# Patient Record
Sex: MALE | Race: WHITE | URBAN - METROPOLITAN AREA
[De-identification: names, ages, dates, MRNs, and addresses within clinical notes are randomized per-mention and may not be internally consistent; named-entity substitution may affect disease eponyms.]

---

## 2017-10-02 ENCOUNTER — HOSPITAL ENCOUNTER (OUTPATIENT)
Dept: SURGERY | Age: 82
Discharge: HOME | End: 2017-10-02
Attending: OPHTHALMOLOGY | Admitting: OPHTHALMOLOGY

## 2017-10-02 VITALS
TEMPERATURE: 97.1 F | OXYGEN SATURATION: 100 % | SYSTOLIC BLOOD PRESSURE: 152 MMHG | HEART RATE: 66 BPM | DIASTOLIC BLOOD PRESSURE: 67 MMHG | RESPIRATION RATE: 18 BRPM

## 2017-10-02 RX ORDER — LABETALOL HYDROCHLORIDE 5 MG/ML
5 INJECTION, SOLUTION INTRAVENOUS EVERY 10 MIN PRN
Status: DISCONTINUED | OUTPATIENT
Start: 2017-10-02 | End: 2017-10-03 | Stop reason: HOSPADM

## 2017-10-02 RX ORDER — SODIUM CHLORIDE 0.9 % (FLUSH) 0.9 %
10 SYRINGE (ML) INJECTION PRN
Status: DISCONTINUED | OUTPATIENT
Start: 2017-10-02 | End: 2017-10-02 | Stop reason: SDUPTHER

## 2017-10-02 RX ORDER — SODIUM CHLORIDE 0.9 % (FLUSH) 0.9 %
10 SYRINGE (ML) INJECTION EVERY 12 HOURS SCHEDULED
Status: DISCONTINUED | OUTPATIENT
Start: 2017-10-02 | End: 2017-10-03 | Stop reason: HOSPADM

## 2017-10-02 RX ORDER — CIPROFLOXACIN HYDROCHLORIDE 3.5 MG/ML
1 SOLUTION/ DROPS TOPICAL SEE ADMIN INSTRUCTIONS
Status: COMPLETED | OUTPATIENT
Start: 2017-10-02 | End: 2017-10-02

## 2017-10-02 RX ORDER — SODIUM CHLORIDE 0.9 % (FLUSH) 0.9 %
10 SYRINGE (ML) INJECTION PRN
Status: DISCONTINUED | OUTPATIENT
Start: 2017-10-02 | End: 2017-10-03 | Stop reason: HOSPADM

## 2017-10-02 RX ORDER — ONDANSETRON 2 MG/ML
4 INJECTION INTRAMUSCULAR; INTRAVENOUS
Status: ACTIVE | OUTPATIENT
Start: 2017-10-02 | End: 2017-10-02

## 2017-10-02 RX ORDER — SODIUM CHLORIDE 9 MG/ML
INJECTION, SOLUTION INTRAVENOUS CONTINUOUS
Status: DISCONTINUED | OUTPATIENT
Start: 2017-10-02 | End: 2017-10-03 | Stop reason: HOSPADM

## 2017-10-02 RX ORDER — PROMETHAZINE HYDROCHLORIDE 25 MG/ML
6.25 INJECTION, SOLUTION INTRAMUSCULAR; INTRAVENOUS
Status: ACTIVE | OUTPATIENT
Start: 2017-10-02 | End: 2017-10-02

## 2017-10-02 RX ORDER — SODIUM CHLORIDE 0.9 % (FLUSH) 0.9 %
10 SYRINGE (ML) INJECTION EVERY 12 HOURS SCHEDULED
Status: DISCONTINUED | OUTPATIENT
Start: 2017-10-02 | End: 2017-10-02 | Stop reason: SDUPTHER

## 2017-10-02 RX ADMIN — SODIUM CHLORIDE: 9 INJECTION, SOLUTION INTRAVENOUS at 07:01

## 2017-10-02 RX ADMIN — CIPROFLOXACIN HYDROCHLORIDE 1 DROP: 3.5 SOLUTION/ DROPS TOPICAL at 06:51

## 2017-10-02 RX ADMIN — Medication 0.1 ML: at 07:03

## 2017-10-02 RX ADMIN — CIPROFLOXACIN HYDROCHLORIDE 1 DROP: 3.5 SOLUTION/ DROPS TOPICAL at 06:53

## 2017-10-02 ASSESSMENT — PAIN - FUNCTIONAL ASSESSMENT: PAIN_FUNCTIONAL_ASSESSMENT: 0-10

## 2017-10-02 ASSESSMENT — PAIN SCALES - GENERAL: PAINLEVEL_OUTOF10: 0

## 2017-10-02 NOTE — ANESTHESIA PRE-OP
Rashad Redmond MD        sodium chloride flush 0.9 % injection 10 mL  10 mL Intravenous PRN Vesna Snider MD        cyclopentolate 1%, phenylephrine 2.5%, tropicamide 1%, ketorolac 0.5% ophthalmic solution  0.5 mL Right Eye See Admin Instructions Renny Morelos MD   0.5 mL at 10/02/17 0708     Vital Signs (Current)   Vitals:    10/02/17 0638   BP: (!) 146/64   Pulse: 58   Resp: 10   Temp: 97 °F (36.1 °C)   SpO2: 100%     Vital Signs Statistics (for past 48 hrs)     BP  Min: 146/64   Min taken time: 10/02/17 0638  Max: 146/64   Max taken time: 10/02/17 0638  Temp  Av °F (36.1 °C)  Min: 97 °F (36.1 °C)   Min taken time: 10/02/17 0638  Max: 97 °F (36.1 °C)   Max taken time: 10/02/17 0638  Pulse  Av  Min: 62   Min taken time: 10/02/17 0638  Max: 62   Max taken time: 10/02/17 0638  Resp  Avg: 10  Min: 10   Min taken time: 10/02/17 5792  Max: 10   Max taken time: 10/02/17 0638  SpO2  Av %  Min: 100 %   Min taken time: 10/02/17 9683  Max: 100 %   Max taken time: 10/02/17 0638    BP Readings from Last 3 Encounters:   10/02/17 (!) 146/64     BMI  There is no height or weight on file to calculate BMI. Estimated body mass index is 21.41 kg/(m^2) as calculated from the following:    Height as of 17: 5' 9\" (1.753 m). Weight as of 17: 145 lb (65.8 kg). CBC No results found for: WBC, RBC, HGB, HCT, MCV, RDW, PLT  CMP  No results found for: NA, K, CL, CO2, BUN, CREATININE, GFRAA, AGRATIO, LABGLOM, GLUCOSE, PROT, CALCIUM, BILITOT, ALKPHOS, AST, ALT  BMP  No results found for: NA, K, CL, CO2, BUN, CREATININE, CALCIUM, GFRAA, LABGLOM, GLUCOSE  POCGlucose  No results for input(s): GLUCOSE in the last 72 hours.    Coags  No results found for: PROTIME, INR, APTT  HCG (If Applicable) No results found for: PREGTESTUR, PREGSERUM, HCG, HCGQUANT   ABGs No results found for: PHART, PO2ART, OSL3FIH, PMP7XGH, BEART, I2WVSJIY   Type & Screen (If Applicable)  No results found for: LABABO, LABRH        NPO Status: Time of last liquid consumption: 0530                        Time of last solid consumption: 2300                        Date of last liquid consumption: 10/02/17                        Date of last solid food consumption: 10/01/17    BMI:   Wt Readings from Last 3 Encounters:   09/26/17 145 lb (65.8 kg)     There is no height or weight on file to calculate BMI. Anesthesia Evaluation  Patient summary reviewed no history of anesthetic complications:   Airway: Mallampati: II  TM distance: >3 FB   Neck ROM: full   Dental:    (+) upper dentures      Pulmonary:negative ROS and normal exam           Cardiovascular:  Exercise tolerance: poor (<4 METS),   (+) hypertension:, valvular problems/murmurs: MR, CHF (EF 35-40):,       ECG reviewed  Rhythm: regular  Rate: normal  Echocardiogram reviewed            Beta Blocker:  Dose within 24 Hrs   Neuro/Psych:   {neg ROS     (-) seizures, TIA and CVA  GI/Hepatic/Renal: neg ROS          Endo/Other: negative ROS       (-) hypothyroidism, blood dyscrasia    Abdominal:                    Anesthesia Plan    ASA 3     MAC and TIVA     intravenous induction   Anesthetic plan and risks discussed with patient. Plan discussed with CRNA. DOS STAFF ADDENDUM:    Pt seen and examined, chart reviewed (including anesthesia, drug and allergy history). No interval changes to history and physical examination. Anesthetic plan, risks, benefits, alternatives, and personnel involved discussed with patient. Patient verbalized an understanding and agrees to proceed.       Zoila Moreno MD  October 2, 2017  7:17 AM        Zoila Moreno MD   10/2/2017

## 2017-10-02 NOTE — OP NOTE
instilled. The patient tolerated the procedure well and was taken to the same day surgery suite in stable condition. Post-operative instructions and follow-up care were arranged.        Electronically signed by: Yaan Diane MD,10/2/2017,8:22 AM

## 2017-10-02 NOTE — IP AVS SNAPSHOT
After Visit Summary  (Discharge Instructions)    Medication List for Home    Based on the information you provided to us as well as any changes during this visit, the following is your updated medication list.  Compare this with your prescription bottles at home. If you have any questions or concerns, contact your primary care physician's office. Daily Medication List (This medication list can be shared with any healthcare provider who is helping you manage your medications)      ASK your doctor about these medications if you have questions        Last Dose    Next Dose Due AM NOON PM NIGHT    acetaminophen 325 MG tablet   Commonly known as:  TYLENOL   Take 650 mg by mouth every 6 hours as needed for Pain                                         carvedilol 25 MG tablet   Commonly known as:  COREG   Take 25 mg by mouth 2 times daily (with meals)                                                 Allergies as of 10/2/2017        Reactions    Morphine Nausea And Vomiting      Immunizations as of 10/2/2017     No immunizations on file. Last Vitals          Most Recent Value    Temp  97.1 °F (36.2 °C)    Pulse  57    Resp  18    BP  (!)  143/80         After Visit Summary    This summary was created for you. Thank you for entrusting your care to us. The following information includes details about your hospital/visit stay along with steps you should take to help with your recovery once you leave the hospital.  In this packet, you will find information about the topics listed below:    · Instructions about your medications including a list of your home medications  · A summary of your hospital visit  · Follow-up appointments once you have left the hospital  · Your care plan at home      You may receive a survey regarding the care you received during your stay. Your input is valuable to us. We encourage you to complete and return your survey in the envelope provided. We hope you will choose us in the future for your healthcare needs. Patient Information     Patient Name Darryle Bring, Sam 9/1/1933      Care Provided at:     Name Address Phone       [address] [place] [address] [phone]            Your Visit    Here you will find information about your visit, including the reason for your visit. Please take this sheet with you when you visit your doctor or other health care provider in the future. It will help determine the best possible medical care for you at that time. If you have any questions once you leave the hospital, please call the department phone number listed below. Why you were here     Your primary diagnosis was:  Not on File      Visit Information     Date & Time Provider Department Dept. Phone    10/2/2017 Zuleima Abdul MD [address] [place] [address] [phone]       Follow-up Appointments    Below is a list of your follow-up and future appointments. This may not be a complete list as you may have made appointments directly with providers that we are not aware of or your providers may have made some for you. Please call your providers to confirm appointments. It is important to keep your appointments. Please bring your current insurance card, photo ID, co-pay, and all medication bottles to your appointment. If self-pay, payment is expected at the time of service.         Future Appointments     10/16/2017 8:00 AM     Appointment with Zuleima Abdul MD; Eduardo Duque [address] at [address] [place] [address] ([phone])   [address] [place] [id number]         Preventive Care        Date Due    Tetanus Combination Vaccine (1 - Tdap) 9/1/1952    Zoster Vaccine 9/1/1993    Pneumococcal Vaccines (two) for all adults aged 72 and over (1 of 2 - PCV13) 9/1/1998    Yearly Flu Vaccine (1) 9/1/2017                 Care Plan Once You Return Home This section includes instructions you will need to follow once you leave the hospital.  Your care team will discuss these with you, so you and those caring for you know how to best care for your health needs at home. This section may also include educational information about certain health topics that may be of help to you. Discharge Instructions             [facility], [place] [address] [facility]  [phone]         Post-Operative Instructions for Day of Cataract Surgery      10/2/2017    Patient Name: Kane Poon  : 1933  MRN: [id number]    1. Your follow up is TODAY at 0300 pm. Remember to bring your eye medications/kit to the office. 2. You may watch TV, walk, and do routine chores. Avoid heavy exertion or straining. 3. Wear shield at bedtime for SEVEN nights. 4. You may take Tylenol or Advil for mild irritation or pain. If you have pain worse than what Tylenol or Advil can manage, call your physician. General Post-Operative Instructions for Cataract Surgery    · COMFORT - Your eye may feel irritated (scratchy, stinging, or achy) this is normal.   · DIET - You may resume your regular diet, no alcohol for 24 hours. · ACTIVITY - Do not lift anything over 25 pounds today, nothing over 50 pounds for the first week. No driving for 24 hours, Do not make any important decisions or sing legal documents for the first 24 hours. · EYE CARE - Use your eye drops as instructed. Wash your hands before using your eye drops. Do not bump, rub, or touch the operative eye. No water in or around your eyes for 24 hours. You may shower from the shoulders down; You may wash your hair 72 hours following surgery. · VISION - You may experience off/on blurry vision today. Your vision will steadily improve over the next days. Call your surgeon if you experience a drastic decrease in your vision.  If your eyelid is closed from the

## 2017-10-15 NOTE — ANESTHESIA PRE-OP
Department of Anesthesiology  Preprocedure Note       Name:  Per Manriquez   Age:  80 y.o.  :  1933                                          MRN:  [id number]         Date:  10/16/2017          [facility] Department of Anesthesiology  Pre-Anesthesia Evaluation/Consultation       Name:  Per Manriquez                                         Age:  80 y.o. MRN:  [id number]           Procedure (Scheduled): Left eye PHACO w /IOL implant    Surgeon:  Dr. Jenni Rosario     Allergies   Allergen Reactions    Morphine Nausea And Vomiting     There is no problem list on file for this patient. Past Medical History:   Diagnosis Date    Mitral valve disorder     mild regurgitation     Past Surgical History:   Procedure Laterality Date    CATARACT REMOVAL WITH IMPLANT Right 10/02/2017    CHOLECYSTECTOMY      HERNIA REPAIR      JOINT REPLACEMENT Right     total knee     Social History   Substance Use Topics    Smoking status: Former Smoker    Smokeless tobacco: Not on file      Comment: quit 35 years ago    Alcohol use Yes      Comment: socially     Medications  Current Outpatient Prescriptions on File Prior to Encounter   Medication Sig Dispense Refill    acetaminophen (TYLENOL) 325 MG tablet Take 650 mg by mouth every 6 hours as needed for Pain      carvedilol (COREG) 25 MG tablet Take 25 mg by mouth 2 times daily (with meals)       No current facility-administered medications on file prior to encounter.       Current Outpatient Prescriptions   Medication Sig Dispense Refill    acetaminophen (TYLENOL) 325 MG tablet Take 650 mg by mouth every 6 hours as needed for Pain      carvedilol (COREG) 25 MG tablet Take 25 mg by mouth 2 times daily (with meals)       Current Facility-Administered Medications   Medication Dose Route Frequency Provider Last Rate Last Dose    0.9 % sodium chloride infusion   Intravenous Continuous Gunjan Monaco MD 75 mL/hr at 10/16/17 0659      sodium chloride flush 0.9 % injection 10 mL  10 mL Intravenous 2 times per day Patel Fernández MD        sodium chloride flush 0.9 % injection 10 mL  10 mL Intravenous PRN Patel Fernández MD        ciprofloxacin (CILOXAN) 0.3 % ophthalmic solution 1 drop  1 drop Left Eye See Admin Instructions Pat Bedolla MD   1 drop at 10/16/17 3506    cyclopentolate 1%, phenylephrine 2.5%, tropicamide 1%, ketorolac 0.5% ophthalmic solution  0.5 mL Left Eye See Admin Instructions Pat Bedolla MD   0.5 mL at 10/16/17 0656     Vital Signs (Current)   Vitals:    10/16/17 0700   BP: (!) 143/69   Pulse: 56   Resp: 20   Temp: 97.5 °F (36.4 °C)   SpO2: 97%     Vital Signs Statistics (for past 48 hrs)     Temp  Av.5 °F (36.4 °C)  Min: 97.5 °F (36.4 °C)   Min taken time: 10/16/17 0700  Max: 97.5 °F (36.4 °C)   Max taken time: 10/16/17 07  Pulse  Av  Min: 64   Min taken time: 10/16/17 0700  Max: 64   Max taken time: 10/16/17 07  Resp  Av  Min: 21   Min taken time: 10/16/17 0700  Max: 21   Max taken time: 10/16/17 07  BP  Min: 143/69   Min taken time: 10/16/17 07  Max: 143/69   Max taken time: 10/16/17 07  SpO2  Av %  Min: 97 %   Min taken time: 10/16/17 0700  Max: 97 %   Max taken time: 10/16/17 0700    BP Readings from Last 3 Encounters:   10/16/17 (!) 143/69   10/02/17 (!) 152/67     BMI  There is no height or weight on file to calculate BMI. Estimated body mass index is 21.41 kg/m² as calculated from the following:    Height as of 17: 5' 9\" (1.753 m). Weight as of 17: 145 lb (65.8 kg). CBC No results found for: WBC, RBC, HGB, HCT, MCV, RDW, PLT  CMP  No results found for: NA, K, CL, CO2, BUN, CREATININE, GFRAA, AGRATIO, LABGLOM, GLUCOSE, PROT, CALCIUM, BILITOT, ALKPHOS, AST, ALT  BMP  No results found for: NA, K, CL, CO2, BUN, CREATININE, CALCIUM, GFRAA, LABGLOM, GLUCOSE  POCGlucose  No results for input(s): GLUCOSE in the last 72 hours.    Coags  No results found for: PROTIME, INR, APTT  HCG (If Applicable) No results 2017  7:11 AM

## 2017-10-16 ENCOUNTER — HOSPITAL ENCOUNTER (OUTPATIENT)
Dept: SURGERY | Age: 82
Discharge: HOME | End: 2017-10-16
Attending: OPHTHALMOLOGY | Admitting: OPHTHALMOLOGY

## 2017-10-16 VITALS
RESPIRATION RATE: 16 BRPM | DIASTOLIC BLOOD PRESSURE: 75 MMHG | HEART RATE: 56 BPM | TEMPERATURE: 97.4 F | SYSTOLIC BLOOD PRESSURE: 140 MMHG | OXYGEN SATURATION: 96 %

## 2017-10-16 DIAGNOSIS — Z98.42 CATARACT EXTRACTION STATUS OF LEFT EYE: ICD-10-CM

## 2017-10-16 RX ORDER — SODIUM CHLORIDE 0.9 % (FLUSH) 0.9 %
10 SYRINGE (ML) INJECTION PRN
Status: DISCONTINUED | OUTPATIENT
Start: 2017-10-16 | End: 2017-10-17 | Stop reason: HOSPADM

## 2017-10-16 RX ORDER — CIPROFLOXACIN HYDROCHLORIDE 3.5 MG/ML
1 SOLUTION/ DROPS TOPICAL SEE ADMIN INSTRUCTIONS
Status: DISCONTINUED | OUTPATIENT
Start: 2017-10-16 | End: 2017-10-17 | Stop reason: HOSPADM

## 2017-10-16 RX ORDER — SODIUM CHLORIDE 0.9 % (FLUSH) 0.9 %
10 SYRINGE (ML) INJECTION PRN
Status: DISCONTINUED | OUTPATIENT
Start: 2017-10-16 | End: 2017-10-16 | Stop reason: SDUPTHER

## 2017-10-16 RX ORDER — SODIUM CHLORIDE 9 MG/ML
INJECTION, SOLUTION INTRAVENOUS CONTINUOUS
Status: DISCONTINUED | OUTPATIENT
Start: 2017-10-16 | End: 2017-10-17 | Stop reason: HOSPADM

## 2017-10-16 RX ORDER — SODIUM CHLORIDE 0.9 % (FLUSH) 0.9 %
10 SYRINGE (ML) INJECTION EVERY 12 HOURS SCHEDULED
Status: DISCONTINUED | OUTPATIENT
Start: 2017-10-16 | End: 2017-10-16 | Stop reason: SDUPTHER

## 2017-10-16 RX ORDER — SODIUM CHLORIDE 0.9 % (FLUSH) 0.9 %
10 SYRINGE (ML) INJECTION EVERY 12 HOURS SCHEDULED
Status: DISCONTINUED | OUTPATIENT
Start: 2017-10-16 | End: 2017-10-17 | Stop reason: HOSPADM

## 2017-10-16 RX ADMIN — SODIUM CHLORIDE: 9 INJECTION, SOLUTION INTRAVENOUS at 06:59

## 2017-10-16 RX ADMIN — CIPROFLOXACIN HYDROCHLORIDE 1 DROP: 3.5 SOLUTION/ DROPS TOPICAL at 06:59

## 2017-10-16 RX ADMIN — Medication 0.1 ML: at 07:01

## 2017-10-16 ASSESSMENT — PAIN SCALES - GENERAL
PAINLEVEL_OUTOF10: 0
PAINLEVEL_OUTOF10: 0

## 2017-10-16 NOTE — OP NOTE
[facility]  [address] [place] [address]. [address] [address]  [phone]      10/16/2017    Patient name: Ruiz Arenas  YOB: 1933  MRN: [id number]    Alleriges: Allergies   Allergen Reactions    Morphine Nausea And Vomiting       Pre-operative diagnosis:  Cataract Left Eye    Post-operative diagnosis:  Same    Procedure Performed:  Phacoemulsification with insertion of a foldable posterior chamber intraocular lens implant to the left eye. Anesthesia:  Topical Anesthesia with MAC. Estimated Blood Loss: None    Specimens removed: None    Limbal Relaxing Incisions:  Axis:N/A    Arc Length: N/A    Complications:  None    Description of Procedure: In the same day surgery center, the patient was given the usual pre-operative regimen. In the operating room suite, the left eye was prepped and draped in the usual sterile fashion. A paracentesis tract was fashioned, after which 0.5 MI of 1% preservative free Lidocaine was injected into the anterior chamber followed by Viscoat for a complete chamber fill. A clear cornea temporal tunnel was created using a keratome. Under optimal magnification and visualization, a continuous curvilinear capsulorrhexis was performed. Hydro-dissection of the lens was carried out using balanced salt solution. The lens was then phacoemulsified using the divide and conquer technique with a total CDE of 7.38. Residual cortex was removed using the I/A handpiece followed by thorough posterior capsule polishing. The capsular bar was then filled with Provisc and the lens was gently delivered into the bag, achieving excellent centration. Triamcinolone/Moxifloxacin was not injected 3.5mm posterior to the limbus. Provisc was removed using the I/A handpiece and the globe was pressurized using balanced salt solution. The paracentesis tract and the temporal wound were both checked and found to be watertight.  The speculum was removed and Betadine 5% was

## 2017-11-16 NOTE — INTERVAL H&P NOTE
Date of Surgery Update:  Daija Borrego was seen, history and physical examination reviewed, and patient examined by me today.  There have been no significant clinical changes since the completion of the previous history and physical.    Electronically signed by: Ely Albarado MD,11/16/2017,12:29 PM